# Patient Record
Sex: MALE | Race: BLACK OR AFRICAN AMERICAN
[De-identification: names, ages, dates, MRNs, and addresses within clinical notes are randomized per-mention and may not be internally consistent; named-entity substitution may affect disease eponyms.]

---

## 2018-07-19 ENCOUNTER — HOSPITAL ENCOUNTER (OUTPATIENT)
Dept: HOSPITAL 58 - CAR | Age: 23
Discharge: HOME | End: 2018-07-19
Attending: PHYSICIAN ASSISTANT

## 2018-07-19 VITALS — BODY MASS INDEX: 22.4 KG/M2

## 2018-07-19 DIAGNOSIS — R07.89: Primary | ICD-10-CM

## 2018-07-19 NOTE — STRESSECHO
Date of Test:  7/19/18     Ordering Physician: CEDRIC STYLES        
Occupation:UNEMPLOYED

Reason for Exam: CHEST PAIN     Height: 74"         Weight: 183 LBS

Resting EKG: SINUS RHYTHM/ NO ACUTE CHANGES           Target Heart Rate: 169   

                      

                                                                               
                                                                        S-T 
SEGMENT





STAGE MPH/GRADE HEART RATE BPM BLOOD PRESSURE MMHG RHYTHM  +/-  ELEVATION 
DEPRESSION SYMPTOMS,COMMENTS

 

AT REST  75 148/88 SR X              NONE

 

1 1.7/10% 100 164/80 SR X              NONE

 

2 2.5/12% 150 168/88 SR X              NONE

 

3 3.4/14%                  

 

4 4.2/16%                  

 

5 5.0/18%                  

 

Immediately After 

 172 194/90 SR X              TARGET MET

 

Minutes Post Exercise 5:00 75 155/84 SR X              NO COMMENTS

 



Minutes Post Exercise 

 

 

 

 

 

           

       





 DURATION OF EXERCISE: 7:22               MAXIMUM HEART  RATE REACHED: 172 BPM 
        REASON FOR TERMINATION: TARGET MET

100% OXYGEN SATURATION ON ROOM AIR WITH EXERCISE          METS 10.1            
                                                                               
                



  INTERPRETATION: 

1. NO EVIDENCE OF ISCHEMIA BY ST-T WAVE

2. NO CHEST PAIN OR DISCOMFORT

3. BLOOD PRESSURE RESPONSE: HYPERTENSION AT REST AND WITH EXERCISE

NORMAL LEFT VENTRICULAR CONTRACTILITY--RESTING AND POST EXERCISE



MTDD

## 2018-07-19 NOTE — ECHOSTRESS
Date of Exam: 7/19/18   Ordering Physician: STEPHANI MUHAMMAD

Reason for Echo: CHEST PAIN, STRESS TEST--NO ISCHEMIA







M-Mode  Normal Adult Results LV Dimensions Normal Adult Results

 

 AoV Opening excursions       >1.6   LVEDD-base-    3.5-5.8  

 

Ao root dimensions     2.0-3.7   LVESD-base-    3.1-4.6  

 

L. Atrium dimensions     1.9-3.8   Post. Wall thickness    0.8-1.1  

 

IV septum (thickness)     0.7-1.2   Post. Wall excursion    0.72-1.3  

 

 Septal motion    Systolic motion   

 

 R. Ventricular cavity    1.5-2.0    LVEF      60%  

 

Paradoxical septal wall motion       



2-D: NORMAL LEFT VENTRICULAR CONTRACTILITY--RESTING AND POST EXERCISE



M-MODE:

MV: 

AV:  

TV:  

PV:  

CHAMBER SIZE: 

WALL MOTION:  NORMAL LEFT VENTRICULAR CONTRACTILITY--RESTING AND POST EXERCISE



PERICARDIUM:  

_______________________________________________________

INTERPRETATION:  

1.  NORMAL LEFT VENTRICULAR CONTRACTILITY--RESTING AND POST EXERCISE

MTDD

## 2018-10-07 ENCOUNTER — HOSPITAL ENCOUNTER (INPATIENT)
Dept: HOSPITAL 58 - ED | Age: 23
Discharge: LEFT BEFORE DISCHARGE | DRG: 310 | End: 2018-10-07
Attending: INTERNAL MEDICINE | Admitting: INTERNAL MEDICINE

## 2018-10-07 VITALS — BODY MASS INDEX: 25 KG/M2

## 2018-10-07 VITALS — SYSTOLIC BLOOD PRESSURE: 99 MMHG | DIASTOLIC BLOOD PRESSURE: 57 MMHG

## 2018-10-07 VITALS — TEMPERATURE: 98.5 F

## 2018-10-07 DIAGNOSIS — F41.9: ICD-10-CM

## 2018-10-07 DIAGNOSIS — T40.5X4A: ICD-10-CM

## 2018-10-07 DIAGNOSIS — E87.6: ICD-10-CM

## 2018-10-07 DIAGNOSIS — R00.0: Primary | ICD-10-CM

## 2018-10-07 PROCEDURE — 80061 LIPID PANEL: CPT

## 2018-10-07 PROCEDURE — 83735 ASSAY OF MAGNESIUM: CPT

## 2018-10-07 PROCEDURE — 80306 DRUG TEST PRSMV INSTRMNT: CPT

## 2018-10-07 PROCEDURE — 93005 ELECTROCARDIOGRAM TRACING: CPT

## 2018-10-07 PROCEDURE — 85025 COMPLETE CBC W/AUTO DIFF WBC: CPT

## 2018-10-07 PROCEDURE — 99285 EMERGENCY DEPT VISIT HI MDM: CPT

## 2018-10-07 PROCEDURE — 84443 ASSAY THYROID STIM HORMONE: CPT

## 2018-10-07 PROCEDURE — 93010 ELECTROCARDIOGRAM REPORT: CPT

## 2018-10-07 PROCEDURE — 96360 HYDRATION IV INFUSION INIT: CPT

## 2018-10-07 PROCEDURE — 84484 ASSAY OF TROPONIN QUANT: CPT

## 2018-10-07 PROCEDURE — 80307 DRUG TEST PRSMV CHEM ANLYZR: CPT

## 2018-10-07 PROCEDURE — 84439 ASSAY OF FREE THYROXINE: CPT

## 2018-10-07 PROCEDURE — 80053 COMPREHEN METABOLIC PANEL: CPT

## 2018-10-07 PROCEDURE — 36415 COLL VENOUS BLD VENIPUNCTURE: CPT

## 2018-10-07 PROCEDURE — 82553 CREATINE MB FRACTION: CPT

## 2018-10-07 PROCEDURE — 82550 ASSAY OF CK (CPK): CPT

## 2018-10-07 RX ADMIN — SODIUM CHLORIDE AND POTASSIUM CHLORIDE SCH MLS/HR: .9; .15 SOLUTION INTRAVENOUS at 06:57

## 2018-10-07 RX ADMIN — SODIUM CHLORIDE SCH: 0.9 INJECTION, SOLUTION INTRAVENOUS at 09:35

## 2018-10-07 RX ADMIN — SODIUM CHLORIDE SCH: 0.9 INJECTION, SOLUTION INTRAVENOUS at 09:36

## 2018-10-07 RX ADMIN — SODIUM CHLORIDE AND POTASSIUM CHLORIDE SCH: .9; .15 SOLUTION INTRAVENOUS at 09:36

## 2018-10-07 RX ADMIN — SODIUM CHLORIDE SCH MLS/HR: 0.9 INJECTION, SOLUTION INTRAVENOUS at 02:05

## 2018-10-07 NOTE — ED.PDOC
General


ED Provider: 


Dr. SHAUN BLUM





Chief Complaint: Palpitations


Stated Complaint: smoked a puff of Cocaine from a friend 30 min ago. Started 

feeling palpiations. Denies any chest pain Also used Marijuana


Time Seen by Physician: 00:55


Mode of Arrival: Walk-In


Information Source: Patient


Exam Limitations: No limitations


Primary Care Provider: 


CEDRIC WESTBROOK





Nursing and Triage Documentation Reviewed and Agree: Yes


Does patient meet sepsis criteria?: Yes


If yes, has appropriate treatment been initiated?: Yes


System Inflammatory Response Syndrome: Pulse >90 BPM, Resp >20/Minute


Sepsis Protocol: 


For patient's 13 years and over:





Temp is 96.8 and below  and greater


Pulse >90 BPM


Resp >20/minute


Acutely Altered Mental Status





Are patient's symptoms suggestive of a new infection, such as:


   -Pneumonia


   -Skin, Soft Tissue


   -Endocarditis


   -UTI


   -Bone, Joint Infection


   -Implantable Device


   -Acute Abdominal Infection


   -Wound Infection


   -Meningitis


   -Blood Stream Catheter Infection


   -Unknown








Review of Systems





- Review Of Systems


Constitutional: Reports: No symptoms


Eyes: Reports: No symptoms


Ears, Nose, Mouth, Throat: Reports: No symptoms


Respiratory: Reports: No symptoms


Cardiac: Reports: Palpitations.  Denies: Chest pain


GI: Reports: No symptoms


: Reports: No symptoms


Musculoskeletal: Reports: No symptoms


Skin: Reports: No symptoms


Neurological: Reports: Anxiety


Endocrine: Reports: No symptoms


Hematologic/Lymphatic: Reports: No symptoms


All Other Systems: Reviewed and Negative





Past Medical History





- Past Medical History


Endocrine: Reports: None


Cardiovascular: Reports: None


Respiratory: Reports: Asthma


Hematological: Reports: None


Gastrointestinal: Reports: None


Genitourinary: Reports: None


Neuro/Psych: Reports: Anxiety


Musculoskeletal: Reports: None


Cancer: Reports: None





- Surgical History


General Surgical History: Reports: Tonsillectomy





- Family History


Family History: Reports: None





- Social History


Smoking Status: Never smoker


Hx Substance Use: Yes (MARIJUANA,      COCAINE TONITE)


Alcohol Screening: Occasionally





- Immunizations


Tetanus Shot up to Date: Yes





Physical Exam





- Physical Exam


Appearance: Ill-appearing


Ill-appearing: Moderate


Pain Distress: None


Eyes: WAYNE, EOMI, Conjunctiva clear


ENT: Ears normal, Nose normal, Oropharynx normal


Neck: Supple


Respiratory: Airway patent, Breath sounds clear, Breath sounds equal, 

Respirations nonlabored


Cardiovascular: Tachycardia


GI/: Soft, Nontender, No masses, Bowel sounds normal, No Organomegaly


Musculoskeletal: Normal strength, ROM intact, No edema, No calf tenderness


Skin: Warm, Dry, Normal color


Neurological: Sensation intact, Motor intact, Reflexes intact, Cranial nerves 

intact, Alert, Oriented


Psychiatric: Anxious





Interpretation





- Cardiac Monitor


Time of Cardiac Monitor Interpretation: 00:20


Rate: Tachy


Rhythm: Sinus





- EKG Interpretation


Time of EKG #1: 01:00


Rate: Tachy


Rhythm: Sinus


Axis: Right


ST Segment: Normal


Interpretation: Sinus tachycardia 





Re-Evaluation





- Re-Evaluation


Time of Re-Evaluation: 01:59


Status: Improved


Vital Signs Stable: Yes (P 109, /80)


Pain Level: none 


Skin: Warm and Dry


Neuro: Alert and Oriented X3





Physician Notification





- Case Discussed


Physician Notified: Dr Castillo 


Time of Notification: 01:59 (Ok to admit- full admission. )





Critical Care Note





- Critical Care Note


Total Time (mins): 50





Course





- Course


Hematology/Chemistry: 


 10/07/18 01:07





 10/07/18 01:07


Orders, Labs, Meds: 


Lab Review











  10/07/18 10/07/18 10/07/18





  01:06 01:07 01:07


 


WBC   14.94 H 


 


RBC   5.20 


 


Hgb   14.2 


 


Hct   42.0 


 


MCV   80.8 


 


MCH   27.3 


 


MCHC   33.8 


 


RDW Coeff of Tiki   12.7 


 


Plt Count   244 


 


Immature Gran % (Auto)   0.2 


 


Neut % (Auto)   34.7 


 


Lymph % (Auto)   55.8 H 


 


Mono % (Auto)   7.8 


 


Eos % (Auto)   1.0 


 


Baso % (Auto)   0.5 


 


Immature Gran # (Auto)   0.0 


 


Neut # (Auto)   5.2 


 


Lymph # (Auto)   8.3 H 


 


Mono # (Auto)   1.2 


 


Eos # (Auto)   0.2 


 


Baso # (Auto)   0.1 


 


Sodium    141.0


 


Potassium    2.60 L*


 


Chloride    99.4


 


Carbon Dioxide    28.8


 


Anion Gap    15.40


 


BUN    14.0


 


Creatinine    0.84


 


Estimated GFR (MDRD)    140.00


 


BUN/Creatinine Ratio    16.66


 


Glucose    135.9 H


 


Calcium    9.07


 


Magnesium    1.68


 


Total Bilirubin    0.81


 


AST    71.6 H


 


ALT    26.8


 


Alkaline Phosphatase    52.0


 


Total Creatine Kinase    879.7 H


 


CK-MB (CK-2)    0.634


 


CK-MB (CK-2) %    0.0700


 


Troponin I    < 0.012


 


Total Protein    8.33 H


 


Albumin    4.78


 


Globulin    3.55


 


Albumin/Globulin Ratio    1.34


 


Triglycerides   


 


Cholesterol   


 


LDL Cholesterol, Calc   


 


VLDL Cholesterol   


 


HDL Cholesterol   


 


Cholesterol/HDL Ratio   


 


Urine Opiates Screen  Negative  


 


Ur Oxycodone Screen  Negative  


 


Urine Methadone Screen  Negative  


 


Ur Propoxyphene Screen  Negative  


 


Ur Barbiturates Screen  Negative  


 


U Tricyclic Antidepress  Negative  


 


Ur Phencyclidine Scrn  Negative  


 


Ur Amphetamine Screen  Negative  


 


U Methamphetamines Scrn  Negative  


 


U Benzodiazepines Scrn  Negative  


 


Urine Cocaine Screen  Negative  


 


U Cannabinoids Screen  Positive  


 


Plasma/Serum Alcohol   














  10/07/18 10/07/18





  01:15 01:25


 


WBC  


 


RBC  


 


Hgb  


 


Hct  


 


MCV  


 


MCH  


 


MCHC  


 


RDW Coeff of Tiki  


 


Plt Count  


 


Immature Gran % (Auto)  


 


Neut % (Auto)  


 


Lymph % (Auto)  


 


Mono % (Auto)  


 


Eos % (Auto)  


 


Baso % (Auto)  


 


Immature Gran # (Auto)  


 


Neut # (Auto)  


 


Lymph # (Auto)  


 


Mono # (Auto)  


 


Eos # (Auto)  


 


Baso # (Auto)  


 


Sodium  


 


Potassium  


 


Chloride  


 


Carbon Dioxide  


 


Anion Gap  


 


BUN  


 


Creatinine  


 


Estimated GFR (MDRD)  


 


BUN/Creatinine Ratio  


 


Glucose  


 


Calcium  


 


Magnesium  


 


Total Bilirubin  


 


AST  


 


ALT  


 


Alkaline Phosphatase  


 


Total Creatine Kinase  


 


CK-MB (CK-2)  


 


CK-MB (CK-2) %  


 


Troponin I  


 


Total Protein  


 


Albumin  


 


Globulin  


 


Albumin/Globulin Ratio  


 


Triglycerides  64.5 


 


Cholesterol  136.5 


 


LDL Cholesterol, Calc  84 


 


VLDL Cholesterol  13 


 


HDL Cholesterol  39.9 


 


Cholesterol/HDL Ratio  3.4 L 


 


Urine Opiates Screen  


 


Ur Oxycodone Screen  


 


Urine Methadone Screen  


 


Ur Propoxyphene Screen  


 


Ur Barbiturates Screen  


 


U Tricyclic Antidepress  


 


Ur Phencyclidine Scrn  


 


Ur Amphetamine Screen  


 


U Methamphetamines Scrn  


 


U Benzodiazepines Scrn  


 


Urine Cocaine Screen  


 


U Cannabinoids Screen  


 


Plasma/Serum Alcohol   < 10.0








Orders











 Category Date Time Status


 


 ADMIT PATIENT AS INPATIENT .TO [facility] (MONITORED BED) ADMISSION  10/07/18 01:

46 Active


 


 EKG-(ED ONLY) Stat CARDIO  10/07/18 01:00 Ordered


 


 INTAKE & OUTPUT Q8HR CARE  10/07/18 01:37 Active


 


 NPO REMINDER: LAB TEST ONCE CARE  10/07/18 01:46 Active


 


 TELEMETRY MONITORING TELE CARE  10/07/18 01:47 Active


 


 VITAL SIGNS Q4HR CARE  10/07/18 01:42 Active


 


 ED IV/MEDIPORT/POWERPORT .ONCE EMERGENCY  10/07/18 01:01 Active


 


 CBC W/ AUTO DIFF DAILY@0600 LAB  10/07/18 06:00 Ordered


 


 CBC W/ AUTO DIFF DAILY@0600 LAB  10/08/18 06:00 Ordered


 


 CBC W/ AUTO DIFF Stat LAB  10/07/18 01:07 Completed


 


 COMPREHENSIVE METABOLIC PANEL DAILY@0600 LAB  10/07/18 06:00 Ordered


 


 COMPREHENSIVE METABOLIC PANEL DAILY@0600 LAB  10/08/18 06:00 Ordered


 


 COMPREHENSIVE METABOLIC PANEL Stat LAB  10/07/18 01:07 Completed


 


 CREATINE KINASE Q8H LAB  10/07/18 07:45 Ordered


 


 CREATINE KINASE Q8H LAB  10/07/18 15:45 Ordered


 


 CREATINE KINASE Stat LAB  10/07/18 01:07 Completed


 


 ETOH LEVEL [BLOOD ALCOHOL] Stat LAB  10/07/18 01:25 Completed


 


 FREE T4 (FREE THYROXINE) Routine LAB  10/07/18 01:15 Received


 


 LIPID PANEL Stat LAB  10/07/18 01:15 Completed


 


 MAGNESIUM Stat LAB  10/07/18 01:07 Completed


 


 THYROID STIMULATING HORMONE Routine LAB  10/07/18 05:00 Ordered


 


 TROPONIN I Q8H LAB  10/07/18 07:45 Ordered


 


 TROPONIN I Q8H LAB  10/07/18 15:45 Ordered


 


 TROPONIN I Stat LAB  10/07/18 01:07 Completed


 


 URINE DRUG SCREEN (RAPID FOR ED) [DRUG SCREEN, URINE, LAB  10/07/18 01:06 

Completed





 RAPID] Stat   


 


 0.9 % Sodium Chloride [Saline Flush] MEDS  10/07/18 01:01 Ordered





 1 syr IVF PRN PRN   


 


 Aspirin [Aspirin Chewable] MEDS  10/07/18 01:37 Discontinued





 324 mg PO ONCE STA   


 


 Aspirin [Aspirin Chewable] MEDS  10/07/18 08:00 Ordered





 81 mg PO DAILYWM   


 


 Ondansetron HCl/Pf [Zofran 4 mg/2 ml] MEDS  10/07/18 01:37 Ordered





 4 mg IVP Q6H PRN   


 


 Potassium Chloride Additive [Potassium Chloride 40 Meq MEDS  10/07/18 01:44 

Discontinued





 Vial-Additive Only]   





 40 meq IV .STK-MED ONE   


 


 Potassium Chloride [K-Dur] MEDS  10/07/18 01:27 Discontinued





 20 meq PO ONCE STA   


 


 Potassium Chloride [Potassium Chloride Premix Run] 100 MEDS  10/07/18 01:32 

Discontinued





 ml   





 IV .STK-MED   


 


 Potassium Chloride in 0.9%NaCl [Sodium Chloride 0.9%- MEDS  10/07/18 02:00 

Ordered





 KCl 20 Meq] 1,000 ml   





  mls/hr   


 


 Sodium Chloride 0.9% [Sodium Chloride] 1,000 ml MEDS  10/07/18 01:01 

Discontinued





 IV BOLUS   


 


 RESUSCITATION STATUS Routine OTHERS  10/07/18 01:37 Ordered








Medications











Generic Name Dose Route Start Last Admin





  Trade Name Anjali  PRN Reason Stop Dose Admin


 


Aspirin  81 mg  10/07/18 08:00  





  Aspirin Chewable  PO   





  DAILYWM SMOOTH   





     





     





     





     


 


Potassium Chloride/Sodium Chloride  1,000 mls @ 150 mls/hr  10/07/18 02:00  





  Sodium Chloride 0.9%-Kcl 20 Meq  IV   





  .Q6H40M SMOOTH   





     





     





     





     


 


Sodium Chloride  1,000 mls @ 250 mls/hr  10/07/18 02:30  10/07/18 02:05





  Sodium Chloride  IV   250 mls/hr





  Q0H SMOOTH   Administration





     





     





     





     


 


Ibuprofen  600 mg  10/07/18 01:56  





  Motrin  PO   





  Q6H PRN   





  mild pain    





     





     





     


 


Lorazepam  1 mg  10/07/18 01:57  





  Ativan  IVP   





  Q2H PRN   





  Agitation   





     





     





     


 


Ondansetron HCl  4 mg  10/07/18 01:37  





  Zofran 4 Mg/2 Ml  IVP   





  Q6H PRN   





  Nausea / Vomiting   





     





     





     


 


Sodium Chloride  1 syr  10/07/18 01:01  





  Saline Flush  IVF   





  PRN PRN   





  To flush IV   





     





     





     














Discontinued Medications














Generic Name Dose Route Start Last Admin





  Trade Name Anjali  PRN Reason Stop Dose Admin


 


Aspirin  324 mg  10/07/18 01:37  10/07/18 01:55





  Aspirin Chewable  PO  10/07/18 01:38  324 mg





  ONCE STA   Administration





     





     





     





     


 


Sodium Chloride  1,000 mls @ 1,000 mls/hr  10/07/18 01:01  10/07/18 01:19





  Sodium Chloride  IV  10/07/18 02:00  1,000 mls/hr





  BOLUS STA   Administration





     





     





     





     


 


Potassium Chloride  20 meq  10/07/18 01:27  10/07/18 01:53





  K-Dur  PO  10/07/18 01:28  20 meq





  ONCE STA   Administration





     





     





     





     


 


Potassium Chloride  40 meq  10/07/18 02:03  10/07/18 02:06





  Potassium Chloride 40 Meq Vial-Additive Only  IV  10/07/18 02:04  40 meq





  ONCE STA   Administration





     





     





     





     











Vital Signs: 


 











  Temp Pulse Resp BP Pulse Ox


 


 10/07/18 00:46  98.4 F  150 H  28 H  154/99 H  100














AIDAN Risk Score


AIDAN Risk Score: 


Risk Score      Odds of death by 30D


      0                 0.1 (0.1-0.2)


      1                 0.3 (0.2-0.3)


      2                 0.4 (0.3-0.5)


      3                 0.7 (0.6-0.9)


      4                 1.2 (1.0-1.5)


      5                 2.2 (1.9-2.6)


      6                 3.0 (2.5-3.6)


      7                 4.8 (3.8-6.1)








Departure





- Departure


Time of Disposition: 01:58


Disposition: HOME SELF-CARE


Discharge Problem: 


 Palpitations, Cocaine use, Tachycardia, Hypokalemia, Granulocytosis





Condition: Fair


Pt referred to PMD for follow-up: Yes


IPMP verified?: No


Allergies/Adverse Reactions: 


Allergies





No Known Drug Allergies Adverse Reaction (Verified 10/07/18 00:52)


 








Home Medications: 


Ambulatory Orders





Ibuprofen [Motrin] 600 mg PO Q6H PRN #30 tablet 06/11/16

## 2018-10-09 NOTE — SSS
DATE OF SERVICE: 10/07/18  (Admitted and discharged the same day - left AMA)



REASON FOR ADMISSION: Palpitations.



HISTORY OF PRESENT ILLNESS:  22 year old white male was brought to the 
emergency room because he smoked a puff of cocaine from a friend 30 minutes 
prior to coming to the hospital.  He started feeling palpitations.  He denies 
any chest pain.  Also, he uses marijuana periodically.



REVIEW OF SYSTEMS:  When I examined the patient, they were all negative. 

CONSTITUTIONAL:  No night sweats.  No fatigue, malaise, lethargy.  No fever or 
chills.

HEENT:  Eyes:  No visual changes.  No eye pain.  No eye discharge.  ENT:  No 
runny nose.  No epistaxis.  No sinus pain.  No sore throat.  No odynophagia.  
No ear pain.  No congestion.

RESPIRATORY:  No cough, no congestion.  No hemoptysis. No shortness of breath. 

CARDIOVASCULAR:  No angina symptoms. No CHF symptoms.  No atypical chest pain 
for CAD.  No palpitations. No orthopnea.

GASTROINTESTINAL:  No abdominal pain.  No nausea or vomiting.  No diarrhea or 
constipation.  No hematemesis.  No hematochezia.  

GENITOURINARY:  No dysuria.  No hematuria.  No obstructive symptoms.  No 
discharge.  No pain.  No significant abnormal bleeding.

MUSCULOSKELETAL:  No musculoskeletal pain.  No joint swelling.  

NEUROLOGICAL:  Awake, alert, oriented to time, place and person. No headache. 
No neck pain. No syncope. No seizures.  No dizziness. 

PSYCHIATRIC:   Not anxious. No depression.  No suicidal thoughts. No homicidal 
thoughts. 

SKIN:  No rash.  No lesions.  No wounds.

ENDOCRINE:  No unexplained weight loss.  No weight gain. 

HEMATOLOGIC/LYMPHATIC:  No anemia.  No purpura.  No petechiae.  No prolonged or 
excessive bleeding.  No palpable lymph nodes. 



PERSONAL/FAMILY HISTORY/SOCIAL HISTORY:  The patient is not  and lives 
by himself.  Girlfriend is in the room.  He doesn't want to give out anymore 
information.



HOME MEDICATIONS:  Ibuprofen 600 mg every 6 hours prn.



PHYSICAL EXAMINATION: 

GENERAL:  The patient is oriented to time, place and person.  

VITAL SIGNS:  Temperature 98.5, pulse 88, respiratory rate 16, blood pressure 
100/60, pulse ox 98%.

HEENT:  Head normocephalic, atraumatic.  Eyes: Extraocular muscles are intact.  
Pupils are equal, round and reactive to light and accommodation.  Ears:  No 
lesions.  Nose appeared normal. Throat: No exudate or erythema.

NECK: Supple. No JVD, no carotid bruit.  No lymphadenopathy or thyromegaly. 

LUNGS:  Clear to auscultation.  Percussion note normal.  Chest  symmetrical. 

HEART:  S1, S2, no S3. No murmurs.  No cyanosis or clubbing.  No ascites.  
Pulses: Dorsalis pedis and posterior tibial pulses +1 to +2 both sides. 

ABDOMEN:  Soft.  Nontender.  Bowel sounds active. No CVA tenderness. No mass 
felt. 

EXTREMITIES:  No edema.  Full range of motion of all extremities, equal. 

NEUROLOGIC:  No focal deficit. Cranial nerves II through XII are grossly 
intact.  No headache, no double vision or headache. 

SKIN: Not dry.  Intact.  Turgor - normal. 

LYMPHATIC:  No palpable lymph nodes/no lymphedema. 

MUSCULOSKELETAL:  Normal joints with no swelling. Muscle tone is normal.



LABS:  Hemoglobin 14, hematocrit 42, WBC 14,000 with normal differential.  
Potassium 2.6, creatinine 0.8, BUN 14, ALT 71, , MD fraction 93, Troponin 
negative.  Urine negative for opioids and everything except for marijuana.  



ALLERGIES: No known drug allergies.  



HOSPITAL COURSE:  22 year old white male hospitalized with having cocaine, 
along with marijuana.  The patient had sinus tachycardia with a rate of 140 per 
minute.  EKG did not show any other changes.  Right four axis deviation noted.  
The patient was given IV fluids.  Potassium supplements were given.  The 
patient potassium repeat in the morning showed 3.8 and his heart rate 80 per 
minute and his cardiovascular status was stable.  No evidence of congestive 
heart failure.  There was no S3, no systolic murmur.  His cardiovascular 
physical examination was negative.  



Note:  This patient signed out against medical advice.  



ASSESSMENT:

1.  SINUS TACHYCARDIA

2.  HYPOKALEMIA

3.  NORMAL LIVER PROFILE

4.  COCAINE AND MARIJUANA USE



FINAL DIAGNOSIS:  Same as admitting diagnosis.



TIME SPENT: More than 70 minutes. 
[facility]